# Patient Record
Sex: MALE | Race: BLACK OR AFRICAN AMERICAN | NOT HISPANIC OR LATINO | Employment: UNEMPLOYED | URBAN - METROPOLITAN AREA
[De-identification: names, ages, dates, MRNs, and addresses within clinical notes are randomized per-mention and may not be internally consistent; named-entity substitution may affect disease eponyms.]

---

## 2022-07-25 ENCOUNTER — HOSPITAL ENCOUNTER (EMERGENCY)
Facility: HOSPITAL | Age: 32
Discharge: HOME | End: 2022-07-25
Attending: FAMILY MEDICINE
Payer: MEDICAID

## 2022-07-25 VITALS
DIASTOLIC BLOOD PRESSURE: 92 MMHG | OXYGEN SATURATION: 98 % | SYSTOLIC BLOOD PRESSURE: 144 MMHG | RESPIRATION RATE: 18 BRPM | TEMPERATURE: 98 F | HEIGHT: 71 IN | HEART RATE: 70 BPM | BODY MASS INDEX: 25.9 KG/M2 | WEIGHT: 185 LBS

## 2022-07-25 DIAGNOSIS — M79.661 PAIN IN RIGHT LOWER LEG: ICD-10-CM

## 2022-07-25 DIAGNOSIS — G89.29 CHRONIC RIGHT SHOULDER PAIN: Primary | ICD-10-CM

## 2022-07-25 DIAGNOSIS — M25.511 CHRONIC RIGHT SHOULDER PAIN: Primary | ICD-10-CM

## 2022-07-25 DIAGNOSIS — M54.2 POSTERIOR NECK PAIN: ICD-10-CM

## 2022-07-25 DIAGNOSIS — R52 PAIN: ICD-10-CM

## 2022-07-25 PROCEDURE — 63600175 PHARM REV CODE 636 W HCPCS: Performed by: FAMILY MEDICINE

## 2022-07-25 PROCEDURE — 99285 EMERGENCY DEPT VISIT HI MDM: CPT | Mod: 25

## 2022-07-25 PROCEDURE — 96372 THER/PROPH/DIAG INJ SC/IM: CPT | Performed by: FAMILY MEDICINE

## 2022-07-25 RX ORDER — KETOROLAC TROMETHAMINE 10 MG/1
10 TABLET, FILM COATED ORAL EVERY 6 HOURS
Qty: 15 TABLET | Refills: 0 | Status: SHIPPED | OUTPATIENT
Start: 2022-07-25 | End: 2022-07-30

## 2022-07-25 RX ORDER — DEXAMETHASONE SODIUM PHOSPHATE 4 MG/ML
4 INJECTION, SOLUTION INTRA-ARTICULAR; INTRALESIONAL; INTRAMUSCULAR; INTRAVENOUS; SOFT TISSUE
Status: COMPLETED | OUTPATIENT
Start: 2022-07-25 | End: 2022-07-25

## 2022-07-25 RX ORDER — KETOROLAC TROMETHAMINE 30 MG/ML
30 INJECTION, SOLUTION INTRAMUSCULAR; INTRAVENOUS
Status: COMPLETED | OUTPATIENT
Start: 2022-07-25 | End: 2022-07-25

## 2022-07-25 RX ADMIN — KETOROLAC TROMETHAMINE 30 MG: 30 INJECTION, SOLUTION INTRAMUSCULAR; INTRAVENOUS at 10:07

## 2022-07-25 RX ADMIN — DEXAMETHASONE SODIUM PHOSPHATE 4 MG: 4 INJECTION INTRA-ARTICULAR; INTRALESIONAL; INTRAMUSCULAR; INTRAVENOUS; SOFT TISSUE at 10:07

## 2022-07-25 NOTE — ED PROVIDER NOTES
Encounter Date: 7/25/2022       History     Chief Complaint   Patient presents with    Shoulder Pain     Reports falling off ladder x4 months ago at work, c/o right shoulder pain, neck pain, and knot to right shin. Reports pain has been chronic since accident.      32-year-old states he was in an accident about 4 months ago at work and has continued to have pain.  Patient states he does not see a primary care doctor has not follow with anyone other than going to a [facility] for evaluation.  Patient states he continues to have pain in his neck and his right shoulder and his right shin.  No acute injuries.        Review of patient's allergies indicates:  No Known Allergies  History reviewed. No pertinent past medical history.  History reviewed. No pertinent surgical history.  History reviewed. No pertinent family history.     Review of Systems   Constitutional: Negative for fever.   HENT: Negative for sore throat.    Respiratory: Negative for shortness of breath.    Cardiovascular: Negative for chest pain.   Gastrointestinal: Negative for nausea.   Genitourinary: Negative for dysuria.   Musculoskeletal: Negative for back pain.        Neck pain shoulder pain leg pain   Skin: Negative for rash.   Neurological: Negative for weakness.   Hematological: Does not bruise/bleed easily.       Physical Exam     Initial Vitals [07/25/22 0926]   BP Pulse Resp Temp SpO2   (!) 144/92 70 18 98.3 °F (36.8 °C) 98 %      MAP       --         Physical Exam    Nursing note and vitals reviewed.  Constitutional: He appears well-developed and well-nourished. He is active.   HENT:   Head: Normocephalic and atraumatic.   Eyes: Conjunctivae, EOM and lids are normal. Pupils are equal, round, and reactive to light.   Neck: Trachea normal and phonation normal. Neck supple. No thyroid mass present.   Normal range of motion.  Cardiovascular: Normal rate, regular rhythm, normal heart sounds and normal pulses.   Pulmonary/Chest: Breath sounds  normal.   Abdominal: Abdomen is soft. Bowel sounds are normal.   Musculoskeletal:         General: Normal range of motion.      Cervical back: Normal range of motion and neck supple.      Comments: Patient has a normal exam of the musculoskeletal structures has full range of motion of his neck no motor sensory deficits in the upper extremities good  strength bilaterally right shoulder full range of motion no deformities no swelling right shin little tenderness over the chin no deformities noted patient ambulates in the ER room with no difficulty     Neurological: He is alert and oriented to person, place, and time. He has normal strength and normal reflexes.   Skin: Skin is warm, dry and intact.   Psychiatric: He has a normal mood and affect. His speech is normal and behavior is normal. Judgment and thought content normal. Cognition and memory are normal.         ED Course   Procedures  Labs Reviewed - No data to display       Imaging Results          X-Ray Tibia Fibula 2 View Right (Final result)  Result time 07/25/22 10:14:54    Final result by Conrado Navarro MD (07/25/22 10:14:54)                 Impression:      No acute osseous process appreciated.      Electronically signed by: Conrado Navarro  Date:    07/25/2022  Time:    10:14             Narrative:    EXAMINATION:  XR TIBIA FIBULA 2 VIEW RIGHT    CLINICAL HISTORY:  Pain, unspecified    TECHNIQUE:  AP and lateral views of the right tibia and fibula.    COMPARISON:  None.    FINDINGS:  No acute fracture identified.  Right knee and ankle grossly aligned.                               X-Ray Shoulder Trauma Right (Final result)  Result time 07/25/22 10:11:48    Final result by Conrado Navarro MD (07/25/22 10:11:48)                 Impression:      No acute osseous process identified.      Electronically signed by: Conrado Navarro  Date:    07/25/2022  Time:    10:11             Narrative:    EXAMINATION:  XR SHOULDER TRAUMA 3 VIEW RIGHT    CLINICAL  HISTORY:  Pain, unspecified    TECHNIQUE:  Two or three views of the right shoulder.    COMPARISON:  None    FINDINGS:  No acute fracture identified.  Glenohumeral and AC joints are aligned.  No defined rotator cuff calcification.                               CT Cervical Spine Without Contrast (Final result)  Result time 07/25/22 10:24:31    Final result by Efren Goodrich MD (07/25/22 10:24:31)                 Impression:      No CT evidence of acute cervical spine injury      Electronically signed by: Efren Goodrich MD  Date:    07/25/2022  Time:    10:24             Narrative:    EXAMINATION:  CT CERVICAL SPINE WITHOUT CONTRAST    CLINICAL HISTORY:  Shoulder pain, fall 4 months ago    TECHNIQUE:  Axial CT images were obtained through the cervical spine without contrast.    Coronal and sagittal reconstructions submitted and interpreted.  Total .  Automated exposure control utilized.    COMPARISON:  None    FINDINGS:  No acute fracture or traumatic subluxation.  Vertebral bodies are normal in height and posterior alignment.  Disc heights are maintained.  There is straightening of the normal cervical lordosis which may be positional or due to muscular spasm.    Prevertebral soft tissues are normal.  Lung apices are clear.                                 Medications   ketorolac injection 30 mg (30 mg Intramuscular Given 7/25/22 1001)   dexamethasone injection 4 mg (4 mg Intramuscular Given 7/25/22 1002)                 ED Course as of 07/25/22 1036   Mon Jul 25, 2022   1033 Discussed all results with patient.  Appears to be no acute injuries.  No evidence of injury on x-ray CT scan.  Due to pain being the according to patient for months long I did recommend he sees a primary care doctor may begin some physical therapy evaluation did not feel there is anything more to offer in the emergency room at this time.  Patient acknowledged understanding. [BL]      ED Course User Index  [BL] Michael Nichole MD              Clinical Impression:   Final diagnoses:  [R52] Pain  [M25.511, G89.29] Chronic right shoulder pain (Primary)  [M54.2] Posterior neck pain  [M79.661] Pain in right lower leg          ED Disposition Condition    Discharge Stable        ED Prescriptions     Medication Sig Dispense Start Date End Date Auth. Provider    ketorolac (TORADOL) 10 mg tablet Take 1 tablet (10 mg total) by mouth every 6 (six) hours. for 5 days 15 tablet 7/25/2022 7/30/2022 Michael Nichole MD        Follow-up Information     Follow up With Specialties Details Why Contact Info    [facility] - Emergency Dept Emergency Medicine  As needed [address] [facility] [address] [phone]           Michael Nichole MD  07/25/22 0625

## 2022-07-25 NOTE — ED NOTES
Pt c/o of chronic pain related to a work related injury that happened 4 months ago. Pt states his neck pain radiates down the right arm, pt c/o stiffness to neck and arm. Pt also c/o RLE pain to his leg.